# Patient Record
Sex: MALE | Race: WHITE | NOT HISPANIC OR LATINO | Employment: UNEMPLOYED | URBAN - METROPOLITAN AREA
[De-identification: names, ages, dates, MRNs, and addresses within clinical notes are randomized per-mention and may not be internally consistent; named-entity substitution may affect disease eponyms.]

---

## 2018-05-12 PROBLEM — I21.4 NSTEMI (NON-ST ELEVATED MYOCARDIAL INFARCTION): Status: ACTIVE | Noted: 2018-05-12

## 2018-05-13 PROBLEM — B19.20 HEPATITIS C: Status: ACTIVE | Noted: 2018-05-13

## 2018-05-13 PROBLEM — Z72.0 TOBACCO ABUSE: Status: ACTIVE | Noted: 2018-05-13

## 2018-05-14 PROBLEM — I25.110 CORONARY ARTERY DISEASE INVOLVING NATIVE CORONARY ARTERY OF NATIVE HEART WITH UNSTABLE ANGINA PECTORIS: Status: ACTIVE | Noted: 2018-05-14

## 2018-05-16 PROBLEM — Z78.9 KNOWLEDGE DEFICIT ABOUT THERAPEUTIC DIET: Status: ACTIVE | Noted: 2018-05-16

## 2018-05-31 DIAGNOSIS — I25.10 CORONARY ARTERY DISEASE, ANGINA PRESENCE UNSPECIFIED, UNSPECIFIED VESSEL OR LESION TYPE, UNSPECIFIED WHETHER NATIVE OR TRANSPLANTED HEART: Primary | ICD-10-CM

## 2018-07-12 ENCOUNTER — OFFICE VISIT (OUTPATIENT)
Dept: VASCULAR SURGERY | Facility: CLINIC | Age: 63
End: 2018-07-12
Payer: OTHER GOVERNMENT

## 2018-07-12 VITALS
DIASTOLIC BLOOD PRESSURE: 65 MMHG | SYSTOLIC BLOOD PRESSURE: 104 MMHG | HEART RATE: 74 BPM | WEIGHT: 239.88 LBS | BODY MASS INDEX: 29.21 KG/M2 | HEIGHT: 76 IN

## 2018-07-12 DIAGNOSIS — Z95.1 S/P CABG (CORONARY ARTERY BYPASS GRAFT): Primary | ICD-10-CM

## 2018-07-12 PROCEDURE — 99213 OFFICE O/P EST LOW 20 MIN: CPT | Mod: PBBFAC,PO | Performed by: THORACIC SURGERY (CARDIOTHORACIC VASCULAR SURGERY)

## 2018-07-12 PROCEDURE — 99024 POSTOP FOLLOW-UP VISIT: CPT | Mod: ,,, | Performed by: THORACIC SURGERY (CARDIOTHORACIC VASCULAR SURGERY)

## 2018-07-12 PROCEDURE — 99999 PR PBB SHADOW E&M-EST. PATIENT-LVL III: CPT | Mod: PBBFAC,,, | Performed by: THORACIC SURGERY (CARDIOTHORACIC VASCULAR SURGERY)

## 2018-07-12 NOTE — PROGRESS NOTES
OFFICE VISIT NOTE     Mr. Goldstein underwent coronary artery bypass graft for severe left main coronary   artery stenosis on 05/15/2018.  He is doing well.  Sternum is stable and   incisions have healed well.  Heart has a regular rate and rhythm and lungs are   clear.  He will follow up with his cardiologist and return to see me on a p.r.n.   basis.      RITA  dd: 07/12/2018 15:35:11 (CDT)  td: 07/13/2018 04:21:27 (CDT)  Doc ID   #[id number]  Job ID #[id number]    CC:

## 2019-05-16 ENCOUNTER — TELEPHONE (OUTPATIENT)
Dept: VASCULAR SURGERY | Facility: CLINIC | Age: 64
End: 2019-05-16

## 2019-05-16 NOTE — TELEPHONE ENCOUNTER
Spoke to Laury with Dr Larry, informed her that Dr Garcia does not do surgical clearances, advised her to reach out to patient's cardiologist.

## 2019-05-16 NOTE — TELEPHONE ENCOUNTER
----- Message from Jarod Bills MA sent at 5/16/2019 11:54 AM CDT -----  Contact: Laury with DR Richard Schaeffer with DR Richard Larry would like to be called back regarding Clearance for Pt to have [facility]cd[facility] .    Laury with DR Richard Larry can be reached at  [phone].      Thanks.